# Patient Record
Sex: MALE
[De-identification: names, ages, dates, MRNs, and addresses within clinical notes are randomized per-mention and may not be internally consistent; named-entity substitution may affect disease eponyms.]

---

## 2018-09-22 ENCOUNTER — HOSPITAL ENCOUNTER (EMERGENCY)
Dept: HOSPITAL 92 - SCSER | Age: 63
Discharge: HOME | End: 2018-09-22
Payer: SELF-PAY

## 2018-09-22 DIAGNOSIS — S80.211A: ICD-10-CM

## 2018-09-22 DIAGNOSIS — E78.5: ICD-10-CM

## 2018-09-22 DIAGNOSIS — S50.811A: ICD-10-CM
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
## 2018-09-22 DIAGNOSIS — S01.81XA: Primary | ICD-10-CM

## 2018-09-22 DIAGNOSIS — Z79.82: ICD-10-CM

## 2018-09-22 DIAGNOSIS — S40.211A: ICD-10-CM

## 2018-09-22 DIAGNOSIS — F32.9: ICD-10-CM

## 2018-09-22 PROCEDURE — 12001 RPR S/N/AX/GEN/TRNK 2.5CM/<: CPT

## 2018-09-22 PROCEDURE — 90471 IMMUNIZATION ADMIN: CPT

## 2018-09-22 PROCEDURE — 70450 CT HEAD/BRAIN W/O DYE: CPT

## 2018-09-22 PROCEDURE — 90715 TDAP VACCINE 7 YRS/> IM: CPT
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
## 2018-09-22 NOTE — RAD
RIGHT SHOULDER THREE VIEWS:

9/22/18

 

HISTORY: 

Fall with shoulder pain. 

 

There are moderate arthritic changes of the AC joint. Some minimal changes of the glenohumeral joint 
space. There is no signs of fracture or dislocation. 

 

IMPRESSION:  

No evidence of fracture. 

 

POS: [facility]

## 2018-09-22 NOTE — RAD
PA CHEST AND RIGHT RIBS THREE VIEWS:

9/22/18

 

HISTORY: 

Patient with prostate cancer. Right rib pain after fall.

 

The heart size and mediastinum are within normal limits. The lungs appear clear of any infiltrates. O
n the oblique view, there is some slight irregularity to the anterior most margin of the fifth rib. I
 am not certain if this represents a fracture or just a projectional finding. 

 

IMPRESSION:  

Questionable anterior right fifth rib fracture. 

 

POS: [facility]
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
